# Patient Record
Sex: MALE | Race: WHITE | URBAN - METROPOLITAN AREA
[De-identification: names, ages, dates, MRNs, and addresses within clinical notes are randomized per-mention and may not be internally consistent; named-entity substitution may affect disease eponyms.]

---

## 2017-01-03 ENCOUNTER — HISTORICAL (OUTPATIENT)
Dept: CARDIOLOGY | Facility: HOSPITAL | Age: 77
End: 2017-01-03

## 2017-01-31 ENCOUNTER — HISTORICAL (OUTPATIENT)
Dept: CARDIOLOGY | Facility: HOSPITAL | Age: 77
End: 2017-01-31

## 2017-03-07 ENCOUNTER — HISTORICAL (OUTPATIENT)
Dept: CARDIOLOGY | Facility: HOSPITAL | Age: 77
End: 2017-03-07

## 2017-04-04 ENCOUNTER — HISTORICAL (OUTPATIENT)
Dept: CARDIOLOGY | Facility: HOSPITAL | Age: 77
End: 2017-04-04

## 2017-05-02 ENCOUNTER — HISTORICAL (OUTPATIENT)
Dept: CARDIOLOGY | Facility: HOSPITAL | Age: 77
End: 2017-05-02

## 2017-05-08 ENCOUNTER — HISTORICAL (OUTPATIENT)
Dept: ADMINISTRATIVE | Facility: HOSPITAL | Age: 77
End: 2017-05-08

## 2017-06-05 ENCOUNTER — HISTORICAL (OUTPATIENT)
Dept: CARDIOLOGY | Facility: HOSPITAL | Age: 77
End: 2017-06-05

## 2017-06-19 ENCOUNTER — HISTORICAL (OUTPATIENT)
Dept: CARDIOLOGY | Facility: HOSPITAL | Age: 77
End: 2017-06-19

## 2017-06-22 ENCOUNTER — HISTORICAL (OUTPATIENT)
Dept: RADIOLOGY | Facility: HOSPITAL | Age: 77
End: 2017-06-22

## 2017-07-18 ENCOUNTER — HISTORICAL (OUTPATIENT)
Dept: CARDIOLOGY | Facility: HOSPITAL | Age: 77
End: 2017-07-18

## 2017-08-01 ENCOUNTER — HISTORICAL (OUTPATIENT)
Dept: CARDIOLOGY | Facility: HOSPITAL | Age: 77
End: 2017-08-01

## 2017-08-21 ENCOUNTER — HISTORICAL (OUTPATIENT)
Dept: HEMATOLOGY/ONCOLOGY | Facility: CLINIC | Age: 77
End: 2017-08-21

## 2017-08-29 ENCOUNTER — HISTORICAL (OUTPATIENT)
Dept: CARDIOLOGY | Facility: HOSPITAL | Age: 77
End: 2017-08-29

## 2017-09-26 ENCOUNTER — HISTORICAL (OUTPATIENT)
Dept: CARDIOLOGY | Facility: HOSPITAL | Age: 77
End: 2017-09-26

## 2017-10-30 ENCOUNTER — HISTORICAL (OUTPATIENT)
Dept: CARDIOLOGY | Facility: HOSPITAL | Age: 77
End: 2017-10-30

## 2017-11-13 ENCOUNTER — HISTORICAL (OUTPATIENT)
Dept: CARDIOLOGY | Facility: HOSPITAL | Age: 77
End: 2017-11-13

## 2017-12-05 ENCOUNTER — HISTORICAL (OUTPATIENT)
Dept: CARDIOLOGY | Facility: HOSPITAL | Age: 77
End: 2017-12-05

## 2017-12-19 ENCOUNTER — HISTORICAL (OUTPATIENT)
Dept: ADMINISTRATIVE | Facility: HOSPITAL | Age: 77
End: 2017-12-19

## 2017-12-19 ENCOUNTER — HISTORICAL (OUTPATIENT)
Dept: CARDIOLOGY | Facility: HOSPITAL | Age: 77
End: 2017-12-19

## 2018-01-09 ENCOUNTER — HISTORICAL (OUTPATIENT)
Dept: CARDIOLOGY | Facility: HOSPITAL | Age: 78
End: 2018-01-09

## 2018-01-11 ENCOUNTER — HISTORICAL (OUTPATIENT)
Dept: CARDIOLOGY | Facility: HOSPITAL | Age: 78
End: 2018-01-11

## 2018-01-19 ENCOUNTER — HISTORICAL (OUTPATIENT)
Dept: RADIOLOGY | Facility: HOSPITAL | Age: 78
End: 2018-01-19

## 2018-01-25 ENCOUNTER — HISTORICAL (OUTPATIENT)
Dept: CARDIOLOGY | Facility: HOSPITAL | Age: 78
End: 2018-01-25

## 2018-02-16 ENCOUNTER — HISTORICAL (OUTPATIENT)
Dept: HEMATOLOGY/ONCOLOGY | Facility: CLINIC | Age: 78
End: 2018-02-16

## 2018-02-22 ENCOUNTER — HISTORICAL (OUTPATIENT)
Dept: CARDIOLOGY | Facility: HOSPITAL | Age: 78
End: 2018-02-22

## 2018-03-22 ENCOUNTER — HISTORICAL (OUTPATIENT)
Dept: CARDIOLOGY | Facility: HOSPITAL | Age: 78
End: 2018-03-22

## 2018-04-04 ENCOUNTER — HISTORICAL (OUTPATIENT)
Dept: CARDIOLOGY | Facility: HOSPITAL | Age: 78
End: 2018-04-04

## 2018-05-02 ENCOUNTER — HISTORICAL (OUTPATIENT)
Dept: CARDIOLOGY | Facility: HOSPITAL | Age: 78
End: 2018-05-02

## 2018-05-12 ENCOUNTER — HISTORICAL (OUTPATIENT)
Dept: ADMINISTRATIVE | Facility: HOSPITAL | Age: 78
End: 2018-05-12

## 2018-05-30 ENCOUNTER — HISTORICAL (OUTPATIENT)
Dept: ADMINISTRATIVE | Facility: HOSPITAL | Age: 78
End: 2018-05-30

## 2018-05-30 ENCOUNTER — HISTORICAL (OUTPATIENT)
Dept: CARDIOLOGY | Facility: HOSPITAL | Age: 78
End: 2018-05-30

## 2018-06-13 ENCOUNTER — HISTORICAL (OUTPATIENT)
Dept: CARDIOLOGY | Facility: HOSPITAL | Age: 78
End: 2018-06-13

## 2018-06-26 ENCOUNTER — HISTORICAL (OUTPATIENT)
Dept: CARDIOLOGY | Facility: HOSPITAL | Age: 78
End: 2018-06-26

## 2018-07-10 ENCOUNTER — HISTORICAL (OUTPATIENT)
Dept: CARDIOLOGY | Facility: HOSPITAL | Age: 78
End: 2018-07-10

## 2018-07-31 ENCOUNTER — HISTORICAL (OUTPATIENT)
Dept: CARDIOLOGY | Facility: HOSPITAL | Age: 78
End: 2018-07-31

## 2018-08-16 ENCOUNTER — HISTORICAL (OUTPATIENT)
Dept: ADMINISTRATIVE | Facility: HOSPITAL | Age: 78
End: 2018-08-16

## 2018-08-20 ENCOUNTER — HISTORICAL (OUTPATIENT)
Dept: HEMATOLOGY/ONCOLOGY | Facility: CLINIC | Age: 78
End: 2018-08-20

## 2018-08-20 ENCOUNTER — HISTORICAL (OUTPATIENT)
Dept: ANESTHESIOLOGY | Facility: HOSPITAL | Age: 78
End: 2018-08-20

## 2018-08-31 ENCOUNTER — HISTORICAL (OUTPATIENT)
Dept: RADIOLOGY | Facility: HOSPITAL | Age: 78
End: 2018-08-31

## 2018-09-04 ENCOUNTER — HISTORICAL (OUTPATIENT)
Dept: CARDIOLOGY | Facility: HOSPITAL | Age: 78
End: 2018-09-04

## 2018-09-18 ENCOUNTER — HISTORICAL (OUTPATIENT)
Dept: CARDIOLOGY | Facility: HOSPITAL | Age: 78
End: 2018-09-18

## 2018-10-02 ENCOUNTER — HISTORICAL (OUTPATIENT)
Dept: CARDIOLOGY | Facility: HOSPITAL | Age: 78
End: 2018-10-02

## 2018-10-23 ENCOUNTER — HISTORICAL (OUTPATIENT)
Dept: CARDIOLOGY | Facility: HOSPITAL | Age: 78
End: 2018-10-23

## 2018-11-06 ENCOUNTER — HISTORICAL (OUTPATIENT)
Dept: CARDIOLOGY | Facility: HOSPITAL | Age: 78
End: 2018-11-06

## 2018-12-20 ENCOUNTER — HISTORICAL (OUTPATIENT)
Dept: CARDIOLOGY | Facility: HOSPITAL | Age: 78
End: 2018-12-20

## 2019-01-18 ENCOUNTER — HISTORICAL (OUTPATIENT)
Dept: RADIOLOGY | Facility: HOSPITAL | Age: 79
End: 2019-01-18

## 2019-01-22 ENCOUNTER — HISTORICAL (OUTPATIENT)
Dept: CARDIOLOGY | Facility: HOSPITAL | Age: 79
End: 2019-01-22

## 2019-02-16 ENCOUNTER — HISTORICAL (OUTPATIENT)
Dept: ADMINISTRATIVE | Facility: HOSPITAL | Age: 79
End: 2019-02-16

## 2019-02-16 LAB
INFLUENZA A ANTIGEN, POC: POSITIVE
INFLUENZA B ANTIGEN, POC: NEGATIVE
RAPID GROUP A STREP (OHS): NEGATIVE

## 2019-04-09 ENCOUNTER — HISTORICAL (OUTPATIENT)
Dept: CARDIOLOGY | Facility: HOSPITAL | Age: 79
End: 2019-04-09

## 2019-04-23 ENCOUNTER — HISTORICAL (OUTPATIENT)
Dept: CARDIOLOGY | Facility: HOSPITAL | Age: 79
End: 2019-04-23

## 2019-05-21 ENCOUNTER — HISTORICAL (OUTPATIENT)
Dept: CARDIOLOGY | Facility: HOSPITAL | Age: 79
End: 2019-05-21

## 2019-06-10 ENCOUNTER — HISTORICAL (OUTPATIENT)
Dept: CARDIOLOGY | Facility: HOSPITAL | Age: 79
End: 2019-06-10

## 2019-07-08 ENCOUNTER — HISTORICAL (OUTPATIENT)
Dept: ADMINISTRATIVE | Facility: HOSPITAL | Age: 79
End: 2019-07-08

## 2019-07-08 ENCOUNTER — HISTORICAL (OUTPATIENT)
Dept: CARDIOLOGY | Facility: HOSPITAL | Age: 79
End: 2019-07-08

## 2019-07-12 ENCOUNTER — HISTORICAL (OUTPATIENT)
Dept: RADIOLOGY | Facility: HOSPITAL | Age: 79
End: 2019-07-12

## 2019-08-08 ENCOUNTER — HISTORICAL (OUTPATIENT)
Dept: CARDIOLOGY | Facility: HOSPITAL | Age: 79
End: 2019-08-08

## 2019-08-23 ENCOUNTER — HISTORICAL (OUTPATIENT)
Dept: HEMATOLOGY/ONCOLOGY | Facility: CLINIC | Age: 79
End: 2019-08-23

## 2019-08-27 ENCOUNTER — HISTORICAL (OUTPATIENT)
Dept: ADMINISTRATIVE | Facility: HOSPITAL | Age: 79
End: 2019-08-27

## 2019-09-05 ENCOUNTER — HISTORICAL (OUTPATIENT)
Dept: CARDIOLOGY | Facility: HOSPITAL | Age: 79
End: 2019-09-05

## 2019-10-03 ENCOUNTER — HISTORICAL (OUTPATIENT)
Dept: CARDIOLOGY | Facility: HOSPITAL | Age: 79
End: 2019-10-03

## 2019-10-31 ENCOUNTER — HISTORICAL (OUTPATIENT)
Dept: CARDIOLOGY | Facility: HOSPITAL | Age: 79
End: 2019-10-31

## 2019-12-18 ENCOUNTER — HISTORICAL (OUTPATIENT)
Dept: CARDIOLOGY | Facility: HOSPITAL | Age: 79
End: 2019-12-18

## 2019-12-20 ENCOUNTER — HISTORICAL (OUTPATIENT)
Dept: HEMATOLOGY/ONCOLOGY | Facility: CLINIC | Age: 79
End: 2019-12-20

## 2019-12-20 LAB
ABS NEUT (OLG): 3.95 X10(3)/MCL (ref 2.1–9.2)
ALBUMIN SERPL-MCNC: 4 GM/DL (ref 3.4–5)
ALBUMIN/GLOB SERPL: 1.4 {RATIO}
ALP SERPL-CCNC: 116 UNIT/L (ref 50–136)
ALT SERPL-CCNC: 33 UNIT/L (ref 12–78)
AST SERPL-CCNC: 14 UNIT/L (ref 15–37)
BASOPHILS # BLD AUTO: 0 X10(3)/MCL (ref 0–0.2)
BASOPHILS NFR BLD AUTO: 0.6 %
BILIRUB SERPL-MCNC: 0.4 MG/DL (ref 0.2–1)
BILIRUBIN DIRECT+TOT PNL SERPL-MCNC: 0.2 MG/DL (ref 0–0.2)
BILIRUBIN DIRECT+TOT PNL SERPL-MCNC: 0.2 MG/DL (ref 0–0.8)
BUN SERPL-MCNC: 28 MG/DL (ref 7–18)
CALCIUM SERPL-MCNC: 8.8 MG/DL (ref 8.5–10.1)
CHLORIDE SERPL-SCNC: 107 MMOL/L (ref 98–107)
CO2 SERPL-SCNC: 29 MMOL/L (ref 21–32)
CREAT SERPL-MCNC: 1.32 MG/DL (ref 0.7–1.3)
EOSINOPHIL # BLD AUTO: 0.4 X10(3)/MCL (ref 0–0.9)
EOSINOPHIL NFR BLD AUTO: 5.3 %
ERYTHROCYTE [DISTWIDTH] IN BLOOD BY AUTOMATED COUNT: 13.3 % (ref 11.5–17)
GLOBULIN SER-MCNC: 2.8 GM/DL (ref 2.4–3.5)
GLUCOSE SERPL-MCNC: 172 MG/DL (ref 74–106)
HCT VFR BLD AUTO: 42.4 % (ref 42–52)
HGB BLD-MCNC: 13.6 GM/DL (ref 14–18)
LYMPHOCYTES # BLD AUTO: 1.7 X10(3)/MCL (ref 0.6–4.6)
LYMPHOCYTES NFR BLD AUTO: 25.5 %
MCH RBC QN AUTO: 30.2 PG (ref 27–31)
MCHC RBC AUTO-ENTMCNC: 32.1 GM/DL (ref 33–36)
MCV RBC AUTO: 94.2 FL (ref 80–94)
MONOCYTES # BLD AUTO: 0.5 X10(3)/MCL (ref 0.1–1.3)
MONOCYTES NFR BLD AUTO: 7.9 %
NEUTROPHILS # BLD AUTO: 4 X10(3)/MCL (ref 2.1–9.2)
NEUTROPHILS NFR BLD AUTO: 60.4 %
PLATELET # BLD AUTO: 181 X10(3)/MCL (ref 130–400)
PMV BLD AUTO: 10.1 FL (ref 9.4–12.4)
POC CREATININE: 1.2 MG/DL (ref 0.6–1.3)
POTASSIUM SERPL-SCNC: 4.5 MMOL/L (ref 3.5–5.1)
PROT SERPL-MCNC: 6.8 GM/DL (ref 6.4–8.2)
RBC # BLD AUTO: 4.5 X10(6)/MCL (ref 4.7–6.1)
SODIUM SERPL-SCNC: 142 MMOL/L (ref 136–145)
WBC # SPEC AUTO: 6.6 X10(3)/MCL (ref 4.5–11.5)

## 2020-01-15 ENCOUNTER — HISTORICAL (OUTPATIENT)
Dept: RADIOLOGY | Facility: HOSPITAL | Age: 80
End: 2020-01-15

## 2020-05-26 ENCOUNTER — HISTORICAL (OUTPATIENT)
Dept: HEMATOLOGY/ONCOLOGY | Facility: CLINIC | Age: 80
End: 2020-05-26

## 2020-05-26 LAB
ABS NEUT (OLG): 6.91 X10(3)/MCL (ref 2.1–9.2)
ALBUMIN SERPL-MCNC: 4 GM/DL (ref 3.4–5)
ALBUMIN/GLOB SERPL: 1.7 RATIO (ref 1.1–2)
ALP SERPL-CCNC: 94 UNIT/L (ref 40–150)
ALT SERPL-CCNC: 22 UNIT/L (ref 0–55)
AST SERPL-CCNC: 19 UNIT/L (ref 5–34)
BASOPHILS # BLD AUTO: 0 X10(3)/MCL (ref 0–0.2)
BASOPHILS NFR BLD AUTO: 0.5 %
BILIRUB SERPL-MCNC: 0.7 MG/DL
BILIRUBIN DIRECT+TOT PNL SERPL-MCNC: 0.2 MG/DL (ref 0–0.5)
BILIRUBIN DIRECT+TOT PNL SERPL-MCNC: 0.5 MG/DL (ref 0–0.8)
BUN SERPL-MCNC: 21.4 MG/DL (ref 8.4–25.7)
CALCIUM SERPL-MCNC: 8.7 MG/DL (ref 8.8–10)
CHLORIDE SERPL-SCNC: 103 MMOL/L (ref 98–107)
CO2 SERPL-SCNC: 25 MMOL/L (ref 23–31)
CREAT SERPL-MCNC: 1.46 MG/DL (ref 0.73–1.18)
EOSINOPHIL # BLD AUTO: 0.2 X10(3)/MCL (ref 0–0.9)
EOSINOPHIL NFR BLD AUTO: 1.7 %
ERYTHROCYTE [DISTWIDTH] IN BLOOD BY AUTOMATED COUNT: 13.1 % (ref 11.5–17)
GLOBULIN SER-MCNC: 2.4 GM/DL (ref 2.4–3.5)
GLUCOSE SERPL-MCNC: 104 MG/DL (ref 82–115)
HCT VFR BLD AUTO: 41.6 % (ref 42–52)
HGB BLD-MCNC: 13.5 GM/DL (ref 14–18)
LDH SERPL-CCNC: 125 UNIT/L (ref 140–271)
LYMPHOCYTES # BLD AUTO: 1.6 X10(3)/MCL (ref 0.6–4.6)
LYMPHOCYTES NFR BLD AUTO: 16.4 %
MCH RBC QN AUTO: 30.8 PG (ref 27–31)
MCHC RBC AUTO-ENTMCNC: 32.5 GM/DL (ref 33–36)
MCV RBC AUTO: 95 FL (ref 80–94)
MONOCYTES # BLD AUTO: 0.8 X10(3)/MCL (ref 0.1–1.3)
MONOCYTES NFR BLD AUTO: 8 %
NEUTROPHILS # BLD AUTO: 6.9 X10(3)/MCL (ref 2.1–9.2)
NEUTROPHILS NFR BLD AUTO: 73.1 %
PLATELET # BLD AUTO: 191 X10(3)/MCL (ref 130–400)
PMV BLD AUTO: 10 FL (ref 9.4–12.4)
POC CREATININE: 1.5 MG/DL (ref 0.6–1.3)
POTASSIUM SERPL-SCNC: 4.8 MMOL/L (ref 3.5–5.1)
PROT SERPL-MCNC: 6.4 GM/DL (ref 5.8–7.6)
RBC # BLD AUTO: 4.38 X10(6)/MCL (ref 4.7–6.1)
SODIUM SERPL-SCNC: 140 MMOL/L (ref 136–145)
WBC # SPEC AUTO: 9.5 X10(3)/MCL (ref 4.5–11.5)

## 2020-06-08 ENCOUNTER — HISTORICAL (OUTPATIENT)
Dept: CARDIOLOGY | Facility: HOSPITAL | Age: 80
End: 2020-06-08

## 2020-06-24 ENCOUNTER — HISTORICAL (OUTPATIENT)
Dept: CARDIOLOGY | Facility: HOSPITAL | Age: 80
End: 2020-06-24

## 2020-06-24 LAB
INR PPP: 3.6
PROTHROMBIN TIME: 43.4 S

## 2020-07-15 ENCOUNTER — HISTORICAL (OUTPATIENT)
Dept: CARDIOLOGY | Facility: HOSPITAL | Age: 80
End: 2020-07-15

## 2020-08-12 ENCOUNTER — HISTORICAL (OUTPATIENT)
Dept: CARDIOLOGY | Facility: HOSPITAL | Age: 80
End: 2020-08-12

## 2020-09-23 ENCOUNTER — HISTORICAL (OUTPATIENT)
Dept: CARDIOLOGY | Facility: HOSPITAL | Age: 80
End: 2020-09-23

## 2020-10-08 ENCOUNTER — HISTORICAL (OUTPATIENT)
Dept: CARDIOLOGY | Facility: HOSPITAL | Age: 80
End: 2020-10-08

## 2020-10-22 ENCOUNTER — HISTORICAL (OUTPATIENT)
Dept: CARDIOLOGY | Facility: HOSPITAL | Age: 80
End: 2020-10-22

## 2020-11-23 ENCOUNTER — HISTORICAL (OUTPATIENT)
Dept: CARDIOLOGY | Facility: HOSPITAL | Age: 80
End: 2020-11-23

## 2020-12-01 ENCOUNTER — HISTORICAL (OUTPATIENT)
Dept: HEMATOLOGY/ONCOLOGY | Facility: CLINIC | Age: 80
End: 2020-12-01

## 2020-12-21 ENCOUNTER — HISTORICAL (OUTPATIENT)
Dept: CARDIOLOGY | Facility: HOSPITAL | Age: 80
End: 2020-12-21

## 2021-01-14 ENCOUNTER — HISTORICAL (OUTPATIENT)
Dept: RADIOLOGY | Facility: HOSPITAL | Age: 81
End: 2021-01-14

## 2021-01-18 ENCOUNTER — HISTORICAL (OUTPATIENT)
Dept: CARDIOLOGY | Facility: HOSPITAL | Age: 81
End: 2021-01-18

## 2022-04-07 ENCOUNTER — HISTORICAL (OUTPATIENT)
Dept: ADMINISTRATIVE | Facility: HOSPITAL | Age: 82
End: 2022-04-07

## 2022-04-23 VITALS
OXYGEN SATURATION: 95 % | WEIGHT: 262 LBS | BODY MASS INDEX: 33.62 KG/M2 | HEIGHT: 74 IN | SYSTOLIC BLOOD PRESSURE: 108 MMHG | DIASTOLIC BLOOD PRESSURE: 70 MMHG

## 2022-05-01 NOTE — HISTORICAL [facility]
This is a historical note converted from Ron. Formatting and pictures may have been removed.  Please reference Ron for original formatting and attached multimedia. Chief Complaint  fell yesterday walking dog to car-tripped over concrete parking block- upper back/right wrist pain  History of Present Illness  76 yo M present with c/o back and wrist pain after fall yesterday.? Pt was walking dog and fell over a parking block.? Pushed his wrist of R hand back (pt right handed).? Landed on his upper back.? + tenderness between shoulder blades.? Pt is on Coumadin.? Did not hit his head.? Mild pulling sensation of neck.  Review of Systems  Constitutional_negative for fever  ENMT_negative for rhinorrhea, sinus pressure, sore throat,?blurry vision or change in vision  Respiratory_negative for?cough, SOB  Gastrointestinal_negative for nausea or vomiting  Integumentary_per HPI  Physical Exam  Vitals & Measurements  T:?36.6? ?C (Oral)? HR:?88(Peripheral)? RR:?22? BP:?110/69? SpO2:?99%?  HT:?188?cm? HT:?188?cm? WT:?126.5?kg? WT:?126.5?kg? BMI:?35.79?  Gen: WD NAD  CV: S1S2 RRR no MRG  Resp: CTA B  MS:?+ swelling to spinous tissue of the lower thoracic spine as well as tenderness to palpation of the?spinous?processes of the lower thoracic spine,?no tenderness to palpation over the paraspinous areas of the cervical lumbar or thoracic spine, positive tightness sensation of the paraspinous muscles of the cervical spine, positive reduced range of motion of the neck?throughout?bilaterally,?positive pain with range of motion of the right wrist, positive swelling of the dorsum of the right wrist,?positive tenderness to palpation over the dorsum of the right wrist.? No gross deformities,?positive pain of the thoracic spine with elevation of the arms?but no tenderness of the shoulders bilaterally. Radial pulse?1+ bilateral.?  Psych: Cooperative, approp mood and affect  Assessment/Plan  1.?Right wrist pain  ? X-ray of the wrist done  today, per my review negative for fracture.  Will call with final report.  Ordered:  acetaminophen-HYDROcodone, 1 tab(s), Oral, q6hr, PRN PRN for pain, X 5 day(s), # 20 tab(s), 0 Refill(s), Pharmacy: [id number]  Office/Outpatient Visit Level 4 Established [id number] PC, Right wrist pain  Thoracic spine pain, UCC-RR, 05/12/18 12:20:00 CDT  XR Wrist Right Minimum 3 Views, Routine, 05/12/18 12:20:00 CDT, Fall, dorsum wrist swelling, None, Ambulatory, Rad Type, Right wrist pain, Not Scheduled, 05/12/18 12:20:00 CDT  ?  2.?Thoracic spine pain  ? X-ray of the thoracic spine in today, per my review no acute changes.?  Will call with final report.  Recommend CT of the spine if pain worsens or persists.  Ordered:  acetaminophen-HYDROcodone, 1 tab(s), Oral, q6hr, PRN PRN for pain, X 5 day(s), # 20 tab(s), 0 Refill(s), Pharmacy: [id number]  Office/Outpatient Visit Level 4 Established 02060 PC, Right wrist pain  Thoracic spine pain, UCC-RR, 05/12/18 12:20:00 CDT  XR Spine Thoracic 2 Views, Routine, 05/12/18 12:20:00 CDT, Pain, lower T spine tenderness, None, Ambulatory, Rad Type, Thoracic spine pain, Not Scheduled, 05/12/18 12:20:00 CDT  ?  3.?Right wrist sprain  ?Ice three times a day, wrap for about 5 days to reduce movement but recommend careful stretching twice a day to avoid tightening. Norco for pain. Take stool softener.? Can cause sedation.  Ordered:  acetaminophen-HYDROcodone, 1 tab(s), Oral, q6hr, PRN PRN for pain, X 5 day(s), # 20 tab(s), 0 Refill(s), Pharmacy: [facility]  ?   Problem List/Past Medical History  Ongoing  Anticoagulants causing adverse effect in therapeutic use  Degenerative joint disease  HTN (Hypertension)(  Confirmed  )  Insulin dependent diabetes mellitus type IA 26-SEP-2013 21:26:30  <$>(  Confirmed  )  Left leg DVT  Peripheral neuropathy, idiopathic  Pulmonary embolus  Renal cell cancer  Secondary malignant neoplasm of left lung  Sleep Apnea(  Confirmed   )  Squamous cell skin cancer  Historical  Broken bone  Broken fibula  Broken finger  Hypertension  Lung cancer  Renal cancer  Type II diabetes mellitus  Procedure/Surgical History  Lobectomy of lung (10/01/2012), Nephrectomy (12/01/2002), Thymectomy (01/01/1991), Vocal cord nodules. (01/01/1989), Tonsillectomy (01/01/1947).  Medications  amlodipine-benazepril 10 mg-20 mg oral capsule, See Instructions  Cardura 4 mg oral tablet, 4 mg= 1 tab(s), Oral, At Bedtime  CELECOXIB 200 MG CAPSULE  cyclobenzaprine 10 mg oral tablet, See Instructions, 1 refills  folic acid 1 mg oral tablet, 1 mg= 1 tab(s), Oral, Daily, 3 refills  gabapentin 100 mg oral capsule, 100 mg= 1 cap(s), Oral, TID  l-methylfolate 15 mg oral capsule, 15 mg= 1 cap(s), Oral, Daily, 3 refills  NovoLIN 70/30  omega-3 polyunsaturated fatty acids oral cap, 1000 mg, Oral, Daily  traMADol 50 mg oral tablet, Oral, Daily,? ?Not taking  warfarin 5 mg oral tablet, See Instructions, 12 refills  Allergies  Toradol  Tape  Social History  Alcohol - Denies Alcohol Use, 05/15/2015  Never, 05/27/2015  Employment/School  disabled, 04/21/2016  Exercise - Does not exercise, 05/27/2015  Exercise duration: 0., 04/21/2016  Home/Environment - No Risk, 05/27/2015  Lives with Spouse., 04/21/2016  Nutrition/Health - No Risk, 05/27/2015  Regular, 04/21/2016  Sexual  Sexually active: Yes., 04/21/2016  Substance Abuse - Denies Substance Abuse, 05/15/2015  Never, 05/27/2015  Tobacco - Denies Tobacco Use, 04/29/2014  Never smoker, 05/27/2015  Family History  Acute myocardial infarction.: Father.      Patient/Caregiver provided printed discharge information.

## 2022-05-01 NOTE — HISTORICAL [facility] C[facility]ER
This is a historical note converted from [employer]. Formatting and pictures may have been removed.  Please reference Cermohinder for original formatting and attached multimedia. Chief Complaint  6 days upset stomache, trouble keeping food down, vomitting, 15-20 mins after eating he vomits, only ate few crackers last night and this am and was able to hold it down  History of Present Illness  77-year-old presents with a history of?upset stomach since 6 days.? Complains of vomiting twice after eating food.? No diarrhea, no abdominal pain.? With multiple medical conditions?currently on medications, follows up with primary M.D.? Last BM 5 days ago.? States?not able to hold down anything after eating.? Currently on crackers and water.? No fever, cannot recall eating anything different unusual.? No chest pain or palpitations.  Review of Systems  Constitutional:?no fever,? weakness?  ENMT:?Negative except HPI  Respiratory:?no wheezing,?no shortness of breath  Cardiovascular:?no chest pain  Gastrointestinal:? nausea +, vomiting +, or diarrhea. No abdominal pain  Musculoskeletal:?no muscle or joint pain, no joint swelling  Integumentary:?no skin rash or abnormal lesion  Physical Exam  Vitals & Measurements  T:?36.7? ?C (Oral)? HR:?85(Peripheral)? RR:?18? BP:?108/71? SpO2:?97%?  HT:?126.5?cm? HT:?126.5?cm? WT:?119.1?kg? WT:?119.1?kg? BMI:?74.43?  General : Alert Oriented, No apparent distress, afebrile  Neck - supple  HEENT :?Oral cavity: Mucous membranes pink and moist, posterior pharynx no redness or swelling, bilateral?TM intact, no redness  Respiratory :Lungs are clear to auscultate, respirations not labored, Breath sounds are equal  Cardiovascular : Rate rhythm regular,?systolic murmur left sternal border  Gastrointestinal : Obese abdomen,?soft,?distant bowel sounds?possible due to obesity, mild epigastric discomfort on palpation  Integumentary : Warm, Dry  Neurologic : Alert and Oriented X 4  Assessment/Plan  1.?Constipation  ?  Adequate hydration,?MiraLAX for constipation?as directed  Monitor the symptoms,?with worsening symptoms call or return to clinic  Discussed the x-rays, nonspecific gas pattern, stool pattern.  Ordered:  XR Abdomen KUB 1 View, Routine, 05/30/18 11:53:00 CDT, Constipation, None, Ambulatory, Rad Type, Constipation, Not Scheduled, 05/30/18 11:53:00 CDT  ?  2.?Nausea and vomiting  ? Adequate hydration,?Gatorade and Powerade  Caution with blood pressure medications.  Prilosec over-the-counter?to avoid gastric symptoms  Soft bland diet?like grits,?soup, toast, applesauce?and rice cereal like BRAT diet  Avoid?spicy food, fried food ,soda drinks and caffeine drinks  Monitor the symptoms?and advance diet?once tolerating  Call or return to clinic for any questions.? Follow-up with primary M.D.  Ordered:  ondansetron, 8 mg = 1 tab(s), Oral, q8hr, PRN PRN as needed for nausea/vomiting, allow tablet to dissolve on tongue, # 10 tab(s), 0 Refill(s), Pharmacy: [id number]  ?   Problem List/Past Medical History  Ongoing  Anticoagulants causing adverse effect in therapeutic use  Degenerative joint disease  HTN (Hypertension)(  Confirmed  )  Insulin dependent diabetes mellitus type IA 26-SEP-2013 21:26:30  <$>(  Confirmed  )  Left leg DVT  Peripheral neuropathy, idiopathic  Pulmonary embolus  Renal cell cancer  Secondary malignant neoplasm of left lung  Sleep Apnea(  Confirmed  )  Squamous cell skin cancer  Historical  Broken bone  Broken fibula  Broken finger  Hypertension  Lung cancer  Renal cancer  Type II diabetes mellitus  Procedure/Surgical History  Lobectomy of lung (10/01/2012), Nephrectomy (12/01/2002), Thymectomy (01/01/1991), Vocal cord nodules. (01/01/1989), Tonsillectomy (01/01/1947).  Medications  amlodipine-benazepril 10 mg-20 mg oral capsule, See Instructions  Cardura 4 mg oral tablet, 4 mg= 1 tab(s), Oral, At Bedtime  CELECOXIB 200 MG CAPSULE  cyclobenzaprine 10 mg oral tablet, See Instructions, 1  refills  folic acid 1 mg oral tablet, 1 mg= 1 tab(s), Oral, Daily, 3 refills  gabapentin 100 mg oral capsule, 100 mg= 1 cap(s), Oral, TID  l-methylfolate 15 mg oral capsule, 15 mg= 1 cap(s), Oral, Daily, 3 refills  NovoLIN 70/30  omega-3 polyunsaturated fatty acids oral cap, 1000 mg, Oral, Daily  warfarin 5 mg oral tablet, See Instructions, 12 refills  Zofran ODT 8 mg oral tablet, disintegrating, 8 mg= 1 tab(s), Oral, q8hr, PRN  Allergies  Toradol  Tape  Social History  Alcohol - Denies Alcohol Use, 05/15/2015  Never, 05/27/2015  Employment/School  disabled, 04/21/2016  Exercise - Does not exercise, 05/27/2015  Exercise duration: 0., 04/21/2016  Home/Environment - No Risk, 05/27/2015  Lives with Spouse., 04/21/2016  Nutrition/Health - No Risk, 05/27/2015  Regular, 04/21/2016  Sexual  Sexually active: Yes., 04/21/2016  Substance Abuse - Denies Substance Abuse, 05/15/2015  Never, 05/27/2015  Tobacco - Denies Tobacco Use, 04/29/2014  Never smoker, 05/27/2015  Family History  Acute myocardial infarction.: Father.

## 2022-05-01 NOTE — HISTORICAL [facility]
This is a historical note converted from Ron. Formatting and pictures may have been removed.  Please reference Ron for original formatting and attached multimedia. Chief Complaint  cough, sneezing, Body aches, sore throat, weak, congestion, sinus pressure for 5 days  History of Present Illness  78-year-old male with history of diabetes and other chronic problems, on warfarin,?presents with 5 days of sinusitis, fatigue, pharyngitis body aches cough and sneezing.  Review of Systems  Constitutional_+ subjective fever  ENMT_+rhinorrhea, + sinus pressure frontal headache, + sore throat, no blurry vision or change in vision  Respiratory_+ cough  Gastrointestinal_negative for nausea or vomiting  Integumentary_negative for rash  Physical Exam  Vitals & Measurements  T:?37.3? ?C (Oral)? HR:?81(Peripheral)? RR:?17? BP:?117/69? SpO2:?96%?  HT:?188?cm? WT:?124.7?kg? BMI:?35.28?  Gen: WD NAD, fatigued appearing  CV: S1S2 RRR no MRG  Resp:?Scattered wheeze with cough on deep inspiration  HEENT: clear rhinorrhea, no cervical UBALDO, +PND, TMs without bulging or erythema bilaterally, no posterior pharyngeal erythema?  Psych: Cooperative, approp mood and affect  Post neb with shaking, less cough  Assessment/Plan  1.?Influenza A?J10.1  ? Tamiflu, rest, hydrate, Tylenol.? Saline irrigation to the nose.? No school/work for the next two days, contagious until 24 hours without 100.4 or higher fever.?  Ordered:  dextromethorphan-promethazine, 5 mL, Oral, At Bedtime, PRN PRN for cough, X 5 day(s), # 25 mL, 0 Refill(s), Pharmacy: [facility]  levalbuterol, = 2 puff(s), INH, q4hr, # 15 gm, 0 Refill(s), Pharmacy: [facility]  oseltamivir, 75 mg = 1 cap(s), Oral, BID, X 5 day(s), # 10 cap(s), 0 Refill(s), Pharmacy: [facility]  Office/Outpatient Visit Level 4 Established [id number] PC, Influenza A, UCC-RR, 02/16/19 10:00:00 CST  XR Chest 2 Views, Routine, 02/16/19 9:59:00 CST, Congestion, flu, cough, None, Ambulatory,  Rad Type, Influenza A, Not Scheduled, 02/16/19 9:59:00 CST  ?  2.?Pneumonia?J18.9  ?X-ray of the chest done today, per my review concerning for pneumonia.?Due to symptoms and x-ray well treat?with doxycycline.? Take twice a day with food.  Recommend Xopenex?2?puffs every 4 hours for chest tightness or cough.?Cough medication for nighttime use only.  Also due to severity will treat with Rocephin injection.?  ER for worsening symptoms.  Ordered:  cefTRIAXone, 1 gm, IM, Once-Unscheduled, first dose 02/16/19 10:40:00 CST  dextromethorphan-promethazine, 5 mL, Oral, At Bedtime, PRN PRN for cough, X 5 day(s), # 25 mL, 0 Refill(s), Pharmacy: [facility]  doxycycline, 100 mg = 1 tab(s), Oral, BID, X 10 day(s), # 20 tab(s), 0 Refill(s), Pharmacy: [facility]  levalbuterol, = 2 puff(s), INH, q4hr, # 15 gm, 0 Refill(s), [facility]  ?  Sore throat?J02.9  ? negative strep  positive flu A  ?   Problem List/Past Medical History  Ongoing  Anticoagulants causing adverse effect in therapeutic use  Cardiac murmur, intensity grade II/VI  Degenerative joint disease  HTN (Hypertension)(  Confirmed  )  Left leg DVT  Peripheral neuropathy, idiopathic  Pulmonary embolus  Renal cell cancer  Secondary malignant neoplasm of left lung  Sleep Apnea(  Confirmed  )  Squamous cell skin cancer  Type 2 diabetes mellitus without complication  Historical  Broken bone  Broken fibula  Broken finger  Hypertension  Lung cancer  Renal cancer  Type II diabetes mellitus  Procedure/Surgical History  Lobectomy of lung (10/01/2012)  Nephrectomy (12/01/2002)  Thymectomy (01/01/1991)  Vocal cord nodules removed (01/01/1989)  Tonsillectomy (01/01/1947)   Medications  amlodipine-benazepril 10 mg-20 mg oral capsule, See Instructions, 4 refills  Cardura 4 mg oral tablet, 4 mg= 1 tab(s), Oral, At Bedtime  CELECOXIB 200 MG CAPSULE  cyclobenzaprine 10 mg oral tablet, See Instructions, 1 refills  DULOXETINE HCL DR 60 MG CAP, 60 mg=  1 cap(s), Oral, Daily  FLUoxetine 60 mg oral tablet, 60 mg= 1 tab(s), Oral, Daily  folic acid 1 mg oral tablet, 1 mg= 1 tab(s), Oral, Daily, 3 refills  gabapentin 100 mg oral capsule, See Instructions  GABAPENTIN 300 MG CAPSULE  NovoLIN 70/30, 54 units, Subcutaneous, BIDAC  omega-3 polyunsaturated fatty acids oral cap, 1000 mg, Oral, Daily  One-A-Day 50+ oral tablet, 1 tab(s), Oral, Daily  RANITIDINE 150 MG TABLET  Tamiflu 75 mg oral capsule, 75 mg= 1 cap(s), Oral, BID  traMADol, 50 mg, Oral, q6hr  Urinozinc Prostate + beta sitotetol  warfarin 5 mg oral tablet, See Instructions, 12 refills  Allergies  Toradol  Tape  Social History  Alcohol - Denies Alcohol Use, 05/15/2015  Never, 05/27/2015  Employment/School  disabled, 04/21/2016  Exercise - Does not exercise, 05/27/2015  Exercise duration: 0., 04/21/2016  Home/Environment - No Risk, 05/27/2015  Lives with Spouse., 04/21/2016  Nutrition/Health - No Risk, 05/27/2015  Regular, 04/21/2016  Sexual  Sexually active: Yes., 04/21/2016  Substance Abuse - Denies Substance Abuse, 05/15/2015  Never, 05/27/2015  Tobacco - Denies Tobacco Use, 04/29/2014  Never (less than 100 in lifetime), N/A, 02/16/2019  Never smoker Use:., 08/22/2018  Family History  Acute myocardial infarction.: Father.  Health Maintenance  Health Maintenance  ???Pending?(in the next year)  ??? ??OverDue  ??? ? ? ?Diabetes Maintenance-Microalbumin due??09/24/16??and every 1??year(s)  ??? ? ? ?HF-LVEF due??05/16/17??and every 2??year(s)  ??? ? ? ?Diabetes Maintenance-Eye Exam due??10/05/17??and every 1??year(s)  ??? ??Due?  ??? ? ? ?ADL Screening due??02/16/19??and every 1??year(s)  ??? ? ? ?Cognitive Screening due??02/16/19??and every 1??year(s)  ??? ? ? ?Diabetes Maintenance-Foot Exam due??02/16/19??and every?  ??? ? ? ?Functional Assessment due??02/16/19??and every 1??year(s)  ??? ? ? ?Geriatric Depression Screening due??02/16/19??and every 1??year(s)  ??? ? ? ?Pneumococcal Vaccine due??02/16/19??and  every?  ??? ??Refused?  ??? ? ? ?Aspirin Therapy for CVD Prevention due??02/16/19??and every 1??year(s)  ??? ??Due In Future?  ??? ? ? ?Diabetes Maintenance-Fasting Lipid Profile not due until??08/16/19??and every 1??year(s)  ??? ? ? ?Diabetes Maintenance-Urine Dipstick not due until??08/16/19??and every 1??year(s)  ??? ? ? ?Hypertension Management-BMP not due until??08/20/19??and every 1??year(s)  ??? ? ? ?Diabetes Maintenance-Serum Creatinine not due until??08/20/19??and every 1??year(s)  ??? ? ? ?Advance Directive not due until??08/23/19??and every 1??year(s)  ??? ? ? ?Diabetes Maintenance-HgbA1c not due until??08/29/19??and every 1??year(s)  ??? ? ? ?HF-Heart Failure Education not due until??01/22/20??and every 1??year(s)  ???Satisfied?(in the past 1 year)  ??? ??Satisfied?  ??? ? ? ?Advance Directive on??08/23/18.??Satisfied by Angie Viera LPN  ??? ? ? ?Blood Pressure Screening on??02/16/19.??Satisfied by Fahad Smalls ANakul  ??? ? ? ?Body Mass Index Check on??02/16/19.??Satisfied by Fahad Smalls A.  ??? ? ? ?Depression Screening on??08/23/18.??Satisfied by Angie Viera LPN  ??? ? ? ?Diabetes Maintenance-HgbA1c on??08/29/18.??Satisfied by Rosibel Deluca RN  ??? ? ? ?Diabetes Maintenance-Serum Creatinine on??08/20/18.??Satisfied by Arabella Cantu  ??? ? ? ?Diabetes Maintenance-Urine Dipstick on??08/16/18.??Satisfied by Bailey Fernandes  ??? ? ? ?Diabetes Maintenance-Fasting Lipid Profile on??08/16/18.??Satisfied by Melanie Martin MT  ??? ? ? ?Diabetes Screening on??08/20/18.??Satisfied by Arabella Cantu  ??? ? ? ?Fall Risk Assessment on??02/16/19.??Satisfied by Fahad Smalls  ??? ? ? ?Hypertension Management-Blood Pressure on??02/16/19.??Satisfied by Fahad Smalls  ??? ? ? ?Hypertension Management-BMP on??08/20/18.??Satisfied by Arabella Cantu  ??? ? ? ?Lipid Screening on??08/16/18.??Satisfied by Melanie Martin MT  ??? ? ? ?Obesity Screening  on??02/16/19.??Satisfied by Barbara JOHNSON, Fahad WALLS  ?  ?

## 2022-05-01 NOTE — HISTORICAL [facility]ER
This is a historical note converted from Ron. Formatting and pictures may have been removed.  Please reference Ron for original formatting and attached multimedia. Chief Complaint  RT Hip, RT shoulder, and Knee  History of Present Illness  Patient comes in today for his first visit. ?Patient complains of right shoulder and right hip pain. ?Patient states his shoulder has been bothering her for many years. ?He has pain especially with overhead activities and reaching.? He is right-handed dominant. ?He tried rest of medication without relief. ?In regards to his right hip, he states this is worse than his shoulder. ?He has a history of bursitis?as well as diabetic neuropathy to the bilateral lower extremities. ?He has pain on the outside part of his right hip. ?He denies any groin pain he denies any shooting pain in his buttock area. ?He states he has pain while laying on his right hip at night. ?He has tried rest and medication?as well as stretching without relief. ?He states he has had injections in the past which has helped.  Physical Exam  Vitals & Measurements  BP:?123/78? BP:?123/68?(Sitting)? HT:?188?cm? HT:?188?cm? HT:?188?cm? WT:?126.1?kg? WT:?126.1?kg? WT:?126.1?kg? BMI:?35.68?  Patient is well-nourished well-developed male he is awake alert and oriented ?3 he is in no apparent distress he is pleasant and cooperative. ?Examination of the right upper extremity compartments are soft and warm. ?Skin is intact. ?There is no signs or symptoms of DVT or infection.? He is tender along the anterolateral aspect of the right shoulder positive Hopkins positive empty can sign negative drop arm negative sulcus negative apprehension negative OBriens. ?He is able to forward flex 140? with some discomfort.? Supination of right hip he is point tender along the lateral trochanteric area. ?He does have pain with resisted abduction.? He is able to flex 100? external rotate 30? internal rotate 20?. ?Negative [facility]  exam. ?There is no long track signs.? He is neurovascular intact distally. ?There is no signs or symptoms of DVT or infection. ?X-rays 3 views right shoulder demonstrate no evidence fracture or dislocation.? 2 views of the right hip demonstrate minimal arthritic changes.  Assessment/Plan  1.?Impingement syndrome of right shoulder  At this time we discussed his physical exam and x-ray findings. ?Patient has trochanteric bursitis of the right hip as well as subacromial impingement. ?We have discussed various treatment options. ?Under sterile technique he tolerated a steroid injection very well?to the right hip bursal area. ?We have discussed some additional stretching exercises. ?I would like to see him back in 2 weeks to see how he is progressing?and possible inject his right shoulder at that time. ?He will follow his?blood sugar levels closely.  Ordered:  asp/inj jnt/bursa, major [id number] PC, 05/08/17 15:30:00 CDT, [facility] - [facility], Routine, 05/08/17 15:30:00 CDT  Clinic Follow up, *Est. 05/22/17 13:00:00 CDT  Office/Outpatient Visit Level 3 New [id number] PC, Impingement syndrome of right shoulder  Rotator cuff tendonitis  Bursitis of right hip, [facility], 05/08/17 15:29:00 CDT  ?  2.?Rotator cuff tendonitis  Ordered:  asp/inj jnt/bursa, major [id number] PC, 05/08/17 15:30:00 CDT, [facility], Routine, 05/08/17 15:30:00 CDT  Clinic Follow up, *Est. 05/22/17 13:00:00 CDT  Office/Outpatient Visit Level 3 New [id number] PC, Impingement syndrome of right shoulder  Rotator cuff tendonitis  Bursitis of right hip, [facility], 05/08/17 15:29:00 CDT  ?  3.?Bursitis of right hip  Ordered:  asp/inj jnt/bursa, major [id number] PC, 05/08/17 15:30:00 CDT, [facility], Routine, 05/08/17 15:30:00 CDT  Clinic Follow up, *Est. 05/22/17 13:00:00 CDT  Office/Outpatient Visit Level 3 New [id number] PC, Impingement syndrome of right shoulder  Rotator cuff tendonitis  Bursitis of right hip, [facility]  [facility]do, 05/08/17 15:29:00 CDT  ?  Pain in right hip  ?  Pain in right shoulder  ?   Problem List/Past Medical History  Anticoagulants causing adverse effect in therapeutic use  Degenerative joint disease  HTN (Hypertension)(  Confirmed  )  Insulin dependent diabetes mellitus type IA 26-SEP-2013 21:26:30  <$>(  Confirmed  )  Left leg DVT  Peripheral neuropathy, idiopathic  Pulmonary embolus  Renal cell cancer  Secondary malignant neoplasm of left lung  Sleep Apnea(  Confirmed  )  Squamous cell skin cancer  Historical  Broken bone  Broken fibula  Broken finger  Hypertension  Lung cancer  Renal cancer  Type II diabetes mellitus  Procedure/Surgical History  Lobectomy of lung (10/01/2012), Nephrectomy (12/01/2002), Thymectomy (01/01/1991), Vocal cord nodules. (01/01/1989), Tonsillectomy (01/01/1947).  Medications  amLODIPine-benazepril 10 mg-20 mg oral capsule, See Instructions, 3 refills  Cardura 4 mg oral tablet, 4 mg, 1 tab(s), Oral, At Bedtime  Flexeril 10 mg oral tablet, 10 mg, 1 tab(s), Oral, At Bedtime, PRN  l-methylfolate 15 mg oral capsule, 15 mg, 1 cap(s), Oral, Daily, 3 refills  NovoLIN 70/30  omega-3 polyunsaturated fatty acids oral cap, 1000 mg, Oral, Daily  Toujeo SoloStar 300 units/mL subcutaneous solution, As Directed,? ?Not taking  warfarin 5 mg oral tablet, See Instructions, 2 refills  Allergies  Toradol  Tape  Social History  Alcohol - Denies Alcohol Use  Never  Employment/School  disabled  Exercise - Does not exercise  Exercise duration: 0.  Home/Environment - No Risk  Lives with Spouse.  Nutrition/Health - No Risk  Regular  Sexual  Sexually active: Yes.  Substance Abuse - Denies Substance Abuse  Never  Tobacco - Denies Tobacco Use  Never smoker  Family History  Acute myocardial infarction.: Father.